# Patient Record
Sex: FEMALE | Race: WHITE | NOT HISPANIC OR LATINO | URBAN - METROPOLITAN AREA
[De-identification: names, ages, dates, MRNs, and addresses within clinical notes are randomized per-mention and may not be internally consistent; named-entity substitution may affect disease eponyms.]

---

## 2017-03-20 ENCOUNTER — OFFICE VISIT (OUTPATIENT)
Dept: OBSTETRICS AND GYNECOLOGY | Age: 77
End: 2017-03-20

## 2017-03-20 VITALS
SYSTOLIC BLOOD PRESSURE: 180 MMHG | DIASTOLIC BLOOD PRESSURE: 82 MMHG | HEIGHT: 67 IN | BODY MASS INDEX: 24.33 KG/M2 | WEIGHT: 155 LBS

## 2017-03-20 DIAGNOSIS — Z79.890 POST-MENOPAUSE ON HRT (HORMONE REPLACEMENT THERAPY): ICD-10-CM

## 2017-03-20 DIAGNOSIS — Z01.419 WELL WOMAN EXAM WITH ROUTINE GYNECOLOGICAL EXAM: Primary | ICD-10-CM

## 2017-03-20 DIAGNOSIS — I10 ESSENTIAL HYPERTENSION: ICD-10-CM

## 2017-03-20 DIAGNOSIS — Z78.0 MENOPAUSE: ICD-10-CM

## 2017-03-20 PROBLEM — E07.9 DISORDER OF THYROID: Status: ACTIVE | Noted: 2017-03-20

## 2017-03-20 PROBLEM — R00.1 BRADYCARDIA: Status: ACTIVE | Noted: 2017-03-20

## 2017-03-20 PROBLEM — R51.9 HEADACHE: Status: ACTIVE | Noted: 2017-03-20

## 2017-03-20 LAB
BILIRUB BLD-MCNC: NEGATIVE MG/DL
GLUCOSE UR STRIP-MCNC: NEGATIVE MG/DL
KETONES UR QL: NEGATIVE
LEUKOCYTE EST, POC: NEGATIVE
NITRITE UR-MCNC: NEGATIVE MG/ML
PH UR: 7 [PH] (ref 5–8)
PROT UR STRIP-MCNC: NEGATIVE MG/DL
RBC # UR STRIP: NEGATIVE /UL
SP GR UR: 1.01 (ref 1–1.03)
UROBILINOGEN UR QL: NORMAL

## 2017-03-20 PROCEDURE — 81003 URINALYSIS AUTO W/O SCOPE: CPT | Performed by: OBSTETRICS & GYNECOLOGY

## 2017-03-20 PROCEDURE — G0101 CA SCREEN;PELVIC/BREAST EXAM: HCPCS | Performed by: OBSTETRICS & GYNECOLOGY

## 2017-03-20 RX ORDER — POTASSIUM CHLORIDE 20 MEQ/1
20 TABLET, EXTENDED RELEASE ORAL
COMMUNITY
End: 2019-05-30

## 2017-03-20 RX ORDER — LANOLIN ALCOHOL/MO/W.PET/CERES
1000 CREAM (GRAM) TOPICAL
COMMUNITY

## 2017-03-20 RX ORDER — MAGNESIUM OXIDE 400 MG/1
400 TABLET ORAL DAILY
COMMUNITY
End: 2019-05-30

## 2017-03-20 RX ORDER — FEXOFENADINE HCL 180 MG/1
180 TABLET ORAL
COMMUNITY

## 2017-03-20 RX ORDER — ALPRAZOLAM 1 MG/1
1 TABLET ORAL DAILY
COMMUNITY
End: 2017-03-20 | Stop reason: SDUPTHER

## 2017-03-20 NOTE — PROGRESS NOTES
"ANNUAL GYN EXAM        3/20/2017    Subjective   Magdalena Holley is a 76 y.o., G2, ,  White  Female.    Chief complaint: Annual Exam    History of Present Illness:     No Gyn Complaints        = Her  \"Rico\" was diagnosed with multiple myeloma in January of this year.  He noticed a mass growing on his breastbone.  X-ray and biopsy made the diagnosis.  He is on immunotherapy and seems to be shrinking.  He also has multiple other problems.       = She had a vein stripping of varicose veins in her left leg was last year.  Her leg feels much better now.       =Estrogen replacement therapy: Having some night sweats, since last October, despite wearing an estradiol patch. Last patch was today, will not refill now that she is 76 (77 this Friday), and see how she does. Hadn't noticed if the night sweats were occurring towards the end of the weekly patch.        =Urinary incontinence: Wears a pad if going out. Will usually have some slight loss of urine shortly after voiding, so she wears some protection after voiding at home and if going out. Discussed timing of voids and Kegel exercises.    1.Menstrual Hx: JEFRY in 1980 due to uterine fibroids.  2005 L/S BSO,  Dr. Harjit Barnes, benign complex cyst.  Turned out to be a LEFT hydrosalpinx.  Both tubes and ovaries associated with adhesions.      2.Pap Smear Hx:   Never had an abnormal pap smerar.  [id number], negative Pap smear every other year.  No Longer Needs Pap smears.      3.Breast / Ovarian Hx:  Regular SBE.     No family history of breast cancer.   No family history of ovarian cancer.    4.Family Hx of Colon Ca:  None.  Last Colonoscopy 2014, Dr. Roosevelt Aranda, polyp at the splenic flexure, bx showed segmants of tubular adenoma with focal arabella-grade dysplasia. Small rectal polyp also removed that showed ulcerated squamocolumnar junction the mucosa with some mild atypia that was favored to be an ulcerated anal tag.  Then had F/U colonoscopy May 20, 2015, " "and will repeat in 2 years.         Family Hx of Uterine Ca:  None.     5.New Past Medical Hx:   None.     6.New Fam Medical Hx:  Brother with Lung cancer,  of PTE. Sister had throat cancer,  and another brother had bladder cancer- all were smokers.       The following portions of the patient's history were reviewed / updated as appropriate: allergies, current medications, past medical history, past surgical history, past family history, past social history, and problem list.      Review of Systems   Eyes:        Dry eye syndrome, has plugs in the tear ducts.   Endocrine:        Dry skin.   Musculoskeletal: Positive for arthralgias (Bone on bone in right knee.).   Allergic/Immunologic: Positive for environmental allergies (seasonal allergies.).   All other systems reviewed and are negative.      Objective     Visit Vitals   • /82   • Ht 66.5\" (168.9 cm)   • Wt 155 lb (70.3 kg)   • BMI 24.64 kg/m2       PHYSICAL EXAM    Constitutional: Well-developed, well-nourished, thin white female, in no distress, alert and well oriented ×3.    Skin is warm, dry, without rash.       Neck appears normal, trachea in the midline.  Thyroid exam normal.  No carotid bruits bilaterally.         No cervical lymphadenopathy.    Lungs clear to auscultation.  Chest wall appears normal.    Heart with regular rhythm without murmur or gallop.    Breasts: Medium-sized breasts.        Right breast nontender, without dominant mass.  No nipple discharge.            No superficial skin changes.  No axillary adenopathy.        Left breast nontender, without dominant mass.  No nipple discharge.            No superficial skin changes.  No axillary adenopathy.      Abdomen is flat, soft and nontender.No palpable mass.           No hepatosplenomegaly.  Flanks are negative.          Bowel sounds normal.  No abdominal bruit.          No inguinal lymphadenopathy bilaterally.     Pelvic exam :  Vulva normal.           External genitalia " normal.  BUS negative.             Introitus normal.  Vaginal mucosa normal.  Moderate (grade 2) cystocele.  No LUIS with cough.          Vaginal cuff normal, well estrogenized.  No Pap smear.          Bimanual exam is negative.  No palpable mass.  Not tender.          Rectovaginal exam negative .  Had colonoscopy in 2014 and 2015.    Musc /Skel: Lower extremities without edema.     Neuro: Coordination is grossly normal.  Gait is normal.    Psych: Mood and affect is normal.  Behavior normal.  Thought content normal.            Judgment normal.          Magdalena was seen today for gynecologic exam.    Diagnoses and all orders for this visit:    Well woman exam with routine gynecological exam  -     POC Urinalysis Dipstick, Automated    Menopause    Post-menopause on HRT (hormone replacement therapy)  Comments:  We will discontinue the ERT for now and see how she does, just because she is now 76 years old.    Essential hypertension      COMMENTS: Normal GYN exam.  Moderate cystocele present but is not very symptomatic other than some urgency and occasional mild degrees of incontinence.  Because she is now 76, almost 77, we'll stop the ERT and see how she does.  She had been on a very low dose estradiol patch, 0.025 mg.      Fransico Harrington MD

## 2019-05-30 ENCOUNTER — OFFICE VISIT (OUTPATIENT)
Dept: OBSTETRICS AND GYNECOLOGY | Age: 79
End: 2019-05-30

## 2019-05-30 ENCOUNTER — APPOINTMENT (OUTPATIENT)
Dept: WOMENS IMAGING | Facility: HOSPITAL | Age: 79
End: 2019-05-30

## 2019-05-30 ENCOUNTER — TELEPHONE (OUTPATIENT)
Dept: OBSTETRICS AND GYNECOLOGY | Age: 79
End: 2019-05-30

## 2019-05-30 ENCOUNTER — PROCEDURE VISIT (OUTPATIENT)
Dept: OBSTETRICS AND GYNECOLOGY | Age: 79
End: 2019-05-30

## 2019-05-30 VITALS
DIASTOLIC BLOOD PRESSURE: 60 MMHG | SYSTOLIC BLOOD PRESSURE: 112 MMHG | HEIGHT: 67 IN | WEIGHT: 162 LBS | BODY MASS INDEX: 25.43 KG/M2

## 2019-05-30 DIAGNOSIS — Z12.31 VISIT FOR SCREENING MAMMOGRAM: Primary | ICD-10-CM

## 2019-05-30 DIAGNOSIS — Z01.419 WELL WOMAN EXAM WITH ROUTINE GYNECOLOGICAL EXAM: Primary | ICD-10-CM

## 2019-05-30 DIAGNOSIS — Z13.89 SCREENING FOR HEMATURIA OR PROTEINURIA: ICD-10-CM

## 2019-05-30 DIAGNOSIS — Z78.0 MENOPAUSE: ICD-10-CM

## 2019-05-30 DIAGNOSIS — N81.11 PELVIC RELAXATION DUE TO CYSTOCELE, MIDLINE: ICD-10-CM

## 2019-05-30 PROCEDURE — 77067 SCR MAMMO BI INCL CAD: CPT | Performed by: RADIOLOGY

## 2019-05-30 PROCEDURE — 99397 PER PM REEVAL EST PAT 65+ YR: CPT | Performed by: OBSTETRICS & GYNECOLOGY

## 2019-05-30 PROCEDURE — 77067 SCR MAMMO BI INCL CAD: CPT | Performed by: OBSTETRICS & GYNECOLOGY

## 2019-05-30 RX ORDER — SODIUM BICARBONATE 650 MG/1
650 TABLET ORAL 4 TIMES DAILY
COMMUNITY

## 2019-05-30 RX ORDER — HYDROCHLOROTHIAZIDE 25 MG/1
25 TABLET ORAL DAILY
COMMUNITY

## 2019-05-30 RX ORDER — ASPIRIN 325 MG
325 TABLET ORAL DAILY
COMMUNITY
End: 2019-05-30

## 2019-05-30 RX ORDER — ATORVASTATIN CALCIUM 80 MG/1
80 TABLET, FILM COATED ORAL DAILY
COMMUNITY

## 2019-05-30 RX ORDER — HYDRALAZINE HYDROCHLORIDE 100 MG/1
100 TABLET, FILM COATED ORAL 2 TIMES DAILY
COMMUNITY

## 2019-05-30 RX ORDER — ASPIRIN 81 MG/1
81 TABLET, CHEWABLE ORAL DAILY
COMMUNITY

## 2019-05-30 RX ORDER — IRON POLYSACCHARIDE COMPLEX 150 MG
150 CAPSULE ORAL 2 TIMES DAILY
COMMUNITY

## 2019-05-30 RX ORDER — MOMETASONE FUROATE 1 MG/G
OINTMENT TOPICAL DAILY
COMMUNITY

## 2019-06-02 PROBLEM — N18.30 KIDNEY DISEASE, CHRONIC, STAGE III (MODERATE, EGFR 30-59 ML/MIN) (HCC): Status: ACTIVE | Noted: 2018-01-01

## 2019-06-02 PROBLEM — E07.9 DISORDER OF THYROID: Status: RESOLVED | Noted: 2017-03-20 | Resolved: 2019-06-02

## 2019-06-02 PROBLEM — N81.11 PELVIC RELAXATION DUE TO CYSTOCELE, MIDLINE: Status: RESOLVED | Noted: 2019-06-02 | Resolved: 2019-06-02

## 2019-06-02 PROBLEM — I63.9 STROKE (HCC): Status: ACTIVE | Noted: 2017-01-01

## 2019-06-02 PROBLEM — N81.11 PELVIC RELAXATION DUE TO CYSTOCELE, MIDLINE: Status: ACTIVE | Noted: 2019-06-02

## 2020-06-15 RX ORDER — LEVOTHYROXINE SODIUM 0.1 MG/1
TABLET ORAL
Qty: 90 TABLET | OUTPATIENT
Start: 2020-06-15

## 2020-06-15 RX ORDER — LISINOPRIL 40 MG/1
TABLET ORAL
Qty: 90 TABLET | OUTPATIENT
Start: 2020-06-15

## 2020-06-15 RX ORDER — ATORVASTATIN CALCIUM 80 MG/1
TABLET, FILM COATED ORAL
Qty: 90 TABLET | OUTPATIENT
Start: 2020-06-15

## 2021-06-08 ENCOUNTER — PROCEDURE VISIT (OUTPATIENT)
Dept: OBSTETRICS AND GYNECOLOGY | Age: 81
End: 2021-06-08

## 2021-06-08 ENCOUNTER — OFFICE VISIT (OUTPATIENT)
Dept: OBSTETRICS AND GYNECOLOGY | Age: 81
End: 2021-06-08

## 2021-06-08 ENCOUNTER — APPOINTMENT (OUTPATIENT)
Dept: WOMENS IMAGING | Facility: HOSPITAL | Age: 81
End: 2021-06-08

## 2021-06-08 VITALS
DIASTOLIC BLOOD PRESSURE: 74 MMHG | BODY MASS INDEX: 25.58 KG/M2 | WEIGHT: 163 LBS | HEIGHT: 67 IN | SYSTOLIC BLOOD PRESSURE: 128 MMHG

## 2021-06-08 DIAGNOSIS — Z78.0 MENOPAUSE: Primary | ICD-10-CM

## 2021-06-08 DIAGNOSIS — N95.2 ATROPHIC VAGINITIS: ICD-10-CM

## 2021-06-08 DIAGNOSIS — Z00.00 ENCOUNTER FOR ANNUAL PHYSICAL EXAM: ICD-10-CM

## 2021-06-08 DIAGNOSIS — Z12.31 VISIT FOR SCREENING MAMMOGRAM: Primary | ICD-10-CM

## 2021-06-08 PROCEDURE — G0101 CA SCREEN;PELVIC/BREAST EXAM: HCPCS | Performed by: OBSTETRICS & GYNECOLOGY

## 2021-06-08 PROCEDURE — 77067 SCR MAMMO BI INCL CAD: CPT | Performed by: RADIOLOGY

## 2021-06-08 PROCEDURE — 77067 SCR MAMMO BI INCL CAD: CPT | Performed by: OBSTETRICS & GYNECOLOGY

## 2021-06-08 NOTE — PROGRESS NOTES
Subjective     Chief Complaint   Patient presents with   • Gynecologic Exam     New gyn: fprmer  pt.,LAC ,colonoscopy 2019,mammo here today       History of Present Illness    Magdalena Holley is a very pleasant 81 y.o. female who presents for annual exam Mammo Exam today, Exercise none except walking around the house  Patient is postmenopausal she has a history of atrophic vaginitis  She also previously has had a JEFRY/BSO  She previously saw one of my partners    She gets all of her wellness and health care from her PCP she is up-to-date on her colonoscopy.  Has had a hysterectomy without any ASCUS lesions does not need future Pap smears.      Obstetric History:  OB History        2    Para   2    Term   2            AB        Living   2       SAB        TAB        Ectopic        Molar        Multiple        Live Births   2               Menstrual History:     No LMP recorded. Patient has had a hysterectomy.       Sexual History:       Past Medical History:   Diagnosis Date   • Allergic rhinitis    • Anxiety    • Breast density     Breast largely fatty replaced.   • Bursitis of left elbow    • Calcified granuloma of lung (CMS/HCC)     Pre op finding before pelvic surgery. Asymptomatic.   • Diverticulosis of colon    • Fatty liver    • Fibrocystic breast    • GERD (gastroesophageal reflux disease)    • H/O adenomatous polyp of colon 2014    Polyp at the Splenic Flexure= TUBULAR ADENOMA WITH FOCAL HIGH GRADE DYSPLASIA.  Small rectal polyp also removed that showed ulcerated anal tag.     • History of degenerative disc disease     back surgery x2    • History of Papanicolaou smear of cervix     Never had an abnormal pap smerar.  [id number], negative Pap smear every other year.  No Longer Needs Pap smears.     • Hypertension    • Hypoactive thyroid     Left thyroidectomy for a nodule. Still has right, but hypoactive.   • Kidney disease, chronic, stage III (moderate, EGFR  "30-59 ml/min) (CMS/[facility]) 2018    Stage III kidney disease from meloxicam for her chronic back pain.  No dialysis.  Sees Dr. Walt Mcintyre, [facility].   • Low back pain    • Low blood potassium    • Menopause    • Osteoarthritis     Knees and ankles.   • Pelvic relaxation due to cystocele, midline 2019    Moderate, grade 2.  Only occasional stress urinary incontinence.   • Postmenopausal HRT (hormone replacement therapy)     Weaned off.   • Restless leg syndrome    • Soft tissue lesion of pelvic region    • Stroke (CMS/[facility]) 2017    Right CVA, \"dragging left foot and left hand issue\".  Complete recovery.  Started cholesterol medication and aspirin.   • Ureterocele 2014    SEE CYSTOSCOPY NOTE under surgical history.   • Vaginal delivery 1959    x2  1959  \"BURAK\"  1963  \"KALIA\"   • Vertigo 2019    Just recently.,  Will follow up with PCP.     Past Surgical History:   Procedure Laterality Date   • ANTERIOR AND POSTERIOR VAGINAL REPAIR  2014    Anterior Colporrhaphy and Enterocele and Upper Rectocele.   • APPENDECTOMY  1980    With Hysterectomy.    • BACK SURGERY  2004    Surgery X2 for Degenerative Disc Disease, and Rods Placed, 2012 Repeat Back Surgery   • BREAST BIOPSY     • COLONOSCOPY  03/07/2014    Dr. Roosevelt Aranda, polyp at the splenic flexure, bx showed segmants of tubular adenoma with focal arabella-grade dysplasia. Small rectal polyp also removed that showed ulcerated squamocolumnar junction the mucosa with some mild atypia that was favored to be an ulcerated anal tag.  We'll have regular followup colonoscopies.     • CYSTOSCOPY  2014    Diagnostic, at the Time of Anterior Colporrhaphy to Evaluate the Ureters.  Ask Dr. Ryan Rodas to Look and Also Becuase of Peculiarities of the RIGHT Ureteral Orifice.  He Determined It Was a Ureterocele and Needed No Further Treatment.   • FOOT SURGERY      [facility]   • LAPAROSCOPIC SALPINGOOPHERECTOMY Bilateral 2005    Dr. Harjit Barnes, because of palpable " "pelvic mass.  Turned out to be a LEFT hydrosalpinx.  Both tubes and ovaries associated with adhesions.     • THYROIDECTOMY Left 1990    Sub-Total, left hemithyroidectomy, B9 Nodule.   • TONSILLECTOMY      Childhood   • TOTAL ABDOMINAL HYSTERECTOMY  1980    Dr. Fransico Cifuentes, uterine fibroids   • VARICOSE VEIN SURGERY  2016     Left leg         SOCIAL Hx:        The following portions of the patient's history were reviewed and updated as appropriate: allergies, current medications, past family history, past medical history, past social history, past surgical history and problem list.    Review of Systems        Except as outlined in history of physical illness, patient denies any changes in her GYN, , GI systems. All other systems reviewed are negative.         Objective   Physical Exam    /74   Ht 168.9 cm (66.5\")   Wt 73.9 kg (163 lb)   Breastfeeding No   BMI 25.91 kg/m²     General: Patient is alert and oriented and appears overall healthy  Neck: Is supple without thyromegaly, no carotid bruits and no lymphadenopathy  Lungs: Clear bilaterally, no wheezing, rhonchi, or rales.  Respiratory rate is normal  Breasts: Symmetrical, no masses, no lymphadenopathy, no erythema, no discharge  Heart: Regular rate and rhythm are appreciated, no murmurs or rubs are heard  Abdomen: Is soft, without organomegaly, bowel sounds are positive, there is no                                rebound or guarding and palpation does not produce any discomfort  Back: Nontender without CVA tenderness  Pelvic: External genitalia appear normal and consistent with mature female.  BUS normal              Urethra and bladder are without any masses or tenderness              Urethral meatus is normal without discharge or masses or tenderness              Bladder is nonenlarged nontender              Vagina is clean dry without discharge and appears adequately estrogenized, no               lesions or masses are present                    "       Rectal digital exam reveals adequate sphincter tone and no masses or lesions are                     appreciated on digital rectal examination.    Annual Well Woman Exam    Patient Active Problem List   Diagnosis   • Acute gastritis without bleeding   • Bradycardia   • Headache   • Hypertension   • Menopause   • H/O adenomatous polyp of colon   • Anxiety   • Low back pain   • Hypoactive thyroid   • Stroke (CMS/[facility])   • Kidney disease, chronic, stage III (moderate, EGFR 30-59 ml/min) (CMS/[facility])   • Allergic rhinitis                Assessment/Plan   Diagnoses and all orders for this visit:    1. Menopause (Primary)      No postpartum bleeding no gynecological concerns or complaints  2. Atrophic vaginitis     Stable, asymptomatic no treatment needed at this time  3. Encounter for annual physical exam      Discussed today's findings and concerns with patient.  Continue to recommend regular exercise including cardiovascular and resistance training as well as  breast self-exam. Wellness lab, mammography, & pap smear, in accordance with age guidelines.    I have encouraged her to call for today's test results if she has not received them within 10 days.  Patient is advised to call with any change in her condition or with any other questions, otherwise return  for annual examination.                 EMR Dragon/ Transcription disclaimer:  Much of the encounter note is an electronic transcription/translation of spoken language to printed text. The electronic translation of spoken language may permit erroneous, or at times, nonessential words or phrases to be inadvertently transcribes; Although i have reviewed the note for such errors, some may still exist.